# Patient Record
Sex: MALE | Race: OTHER | NOT HISPANIC OR LATINO
[De-identification: names, ages, dates, MRNs, and addresses within clinical notes are randomized per-mention and may not be internally consistent; named-entity substitution may affect disease eponyms.]

---

## 2021-10-20 ENCOUNTER — APPOINTMENT (OUTPATIENT)
Dept: BARIATRICS/WEIGHT MGMT | Facility: CLINIC | Age: 51
End: 2021-10-20

## 2021-10-20 VITALS — BODY MASS INDEX: 36.51 KG/M2 | WEIGHT: 255 LBS | HEIGHT: 70 IN

## 2021-10-20 DIAGNOSIS — Z86.79 PERSONAL HISTORY OF OTHER DISEASES OF THE CIRCULATORY SYSTEM: ICD-10-CM

## 2021-10-20 DIAGNOSIS — Z83.3 FAMILY HISTORY OF DIABETES MELLITUS: ICD-10-CM

## 2021-10-20 DIAGNOSIS — Z86.69 PERSONAL HISTORY OF OTHER DISEASES OF THE NERVOUS SYSTEM AND SENSE ORGANS: ICD-10-CM

## 2021-10-20 DIAGNOSIS — Z80.0 FAMILY HISTORY OF MALIGNANT NEOPLASM OF DIGESTIVE ORGANS: ICD-10-CM

## 2021-10-20 PROBLEM — Z00.00 ENCOUNTER FOR PREVENTIVE HEALTH EXAMINATION: Status: ACTIVE | Noted: 2021-10-20

## 2021-10-20 RX ORDER — FINASTERIDE 5 MG/1
5 TABLET, FILM COATED ORAL
Refills: 0 | Status: ACTIVE | COMMUNITY

## 2021-10-20 RX ORDER — TAMSULOSIN HYDROCHLORIDE 0.4 MG/1
0.4 CAPSULE ORAL
Refills: 0 | Status: ACTIVE | COMMUNITY

## 2021-10-20 RX ORDER — METOPROLOL TARTRATE 75 MG/1
TABLET, FILM COATED ORAL
Refills: 0 | Status: ACTIVE | COMMUNITY

## 2021-10-20 RX ORDER — LOSARTAN POTASSIUM 100 MG/1
TABLET, FILM COATED ORAL
Refills: 0 | Status: ACTIVE | COMMUNITY

## 2021-10-20 RX ORDER — AMLODIPINE BESYLATE 5 MG/1
TABLET ORAL
Refills: 0 | Status: ACTIVE | COMMUNITY

## 2021-10-30 ENCOUNTER — APPOINTMENT (OUTPATIENT)
Dept: BARIATRICS/WEIGHT MGMT | Facility: CLINIC | Age: 51
End: 2021-10-30
Payer: COMMERCIAL

## 2021-10-30 VITALS — BODY MASS INDEX: 36.59 KG/M2 | WEIGHT: 255 LBS

## 2021-10-30 DIAGNOSIS — Z86.39 PERSONAL HISTORY OF OTHER ENDOCRINE, NUTRITIONAL AND METABOLIC DISEASE: ICD-10-CM

## 2021-10-30 DIAGNOSIS — R73.03 PREDIABETES.: ICD-10-CM

## 2021-10-30 PROCEDURE — 99443: CPT | Mod: 95

## 2021-10-30 NOTE — ASSESSMENT
[FreeTextEntry1] : 51 year old male with class II obesity with metabolic syndrome\par Exercise goals- 3x a week- walking with intermittent jogging, starting to lift weights at home\par Nutrition- continue to track on my[employer]itnesspal- discussed food moderations, prebariatric surgery planning for eating q3h as instructed by RD, changing macronutritent balance of food intake- more egg whites, portion sizes of avocado, increasing vegetables, portion sizes of fruit, general protein goals\par Continue adequate hydration\par Will not add medications at this time d/t planning for surgery\par F/U 1-2 months

## 2021-10-30 NOTE — HISTORY OF PRESENT ILLNESS
[FreeTextEntry1] : 51 year old male for medical weight loss consultation.\par Patient has been struggling with his weight for many years.  \par Is planning to have a sleeve with Dr. Fung at [facility] but insurance requires 6 months of counseling prior\par Met with bariatric RD last week\par Has not been consistently exercising\par Good water intake\par +work stress- works for 2 non-profits\par Using [employer] intermittently \par +emotional eating patterns- self realized

## 2021-10-30 NOTE — REASON FOR VISIT
[Other Location: e.g. School (Enter Location, City,State)___] : at [unfilled], at the time of the visit. [Other Location: e.g. Home (Enter Location, City,State)___] : at [unfilled] [Verbal consent obtained from patient] : the patient, [unfilled] [Initial Consultation] : an initial consultation for

## 2021-11-24 ENCOUNTER — APPOINTMENT (OUTPATIENT)
Dept: BARIATRICS/WEIGHT MGMT | Facility: CLINIC | Age: 51
End: 2021-11-24
Payer: COMMERCIAL

## 2021-11-24 PROCEDURE — 99442: CPT | Mod: 95

## 2021-11-24 NOTE — REASON FOR VISIT
[Other Location: e.g. Home (Enter Location, City,State)___] : at [unfilled] [Verbal consent obtained from patient] : the patient, [unfilled]

## 2021-11-26 NOTE — ASSESSMENT
[FreeTextEntry1] : 51 year old male with class II obesity c/b HTN\par Discussed importance of establishing healthy routines prior to bariatric surgery to lead to long term success especially if he think she may have an addictive personality which may influence food choices in the future\par Patient to start prioritizing his own health\lisa Is going to send me his meal log for review- from what he told me I advised him to make sure he is eating enough protein/fruit and vegetables\par F/U 1month

## 2021-11-26 NOTE — HISTORY OF PRESENT ILLNESS
[FreeTextEntry1] : 51 year old male for medical weight loss consultation.\par Patient has been struggling with his weight for many years.  \par Is planning to have a sleeve with Dr. Fung at [facility] but insurance requires 6 months of counseling prior\par Met with bariatric RD last week\par Has not been consistently exercising\par Good water intake\par +work stress- works for 2 non-profits\par Using [employer] intermittently \par +emotional eating patterns- self realized \par \par 11/24/21- Patient arrived late for appointment due to work conflict.  He states he now realizes that he needs to cut back on work both for weight loss and for his family.  Not exercising.  Planning to have bariatric surgery but worried about addictive personality and what will happen to him in terms of eating habits.  Has not weighed himself.

## 2021-12-02 VITALS — BODY MASS INDEX: 37.02 KG/M2 | WEIGHT: 258 LBS

## 2021-12-08 VITALS — BODY MASS INDEX: 36.59 KG/M2 | WEIGHT: 255 LBS

## 2021-12-17 VITALS — WEIGHT: 259.5 LBS | BODY MASS INDEX: 37.23 KG/M2

## 2021-12-22 ENCOUNTER — APPOINTMENT (OUTPATIENT)
Dept: BARIATRICS/WEIGHT MGMT | Facility: CLINIC | Age: 51
End: 2021-12-22
Payer: COMMERCIAL

## 2021-12-22 PROCEDURE — 99213 OFFICE O/P EST LOW 20 MIN: CPT | Mod: 95

## 2021-12-23 NOTE — REASON FOR VISIT
[Home] : at home, [unfilled] , at the time of the visit. [Other Location: e.g. Home (Enter Location, City,State)___] : at [unfilled] [Verbal consent obtained from patient] : the patient, [unfilled] [Follow-Up Visit] : a follow-up visit for [Hyperlipidemia] : hyperlipidemia [Obesity] : obesity [Obstructive Sleep Apena] : obstructive sleep apena

## 2021-12-23 NOTE — HISTORY OF PRESENT ILLNESS
[FreeTextEntry1] : 51 year old male for medical weight loss consultation.\par Patient has been struggling with his weight for many years.  \par Is planning to have a sleeve with Dr. Fung at [facility] but insurance requires 6 months of counseling prior\par Met with bariatric RD last week\par Has not been consistently exercising\par Good water intake\par +work stress- works for 2 non-profits\par Using [employer] intermittently \par +emotional eating patterns- self realized \par \par 11/24/21- Patient arrived late for appointment due to work conflict.  He states he now realizes that he needs to cut back on work both for weight loss and for his family.  Not exercising.  Planning to have bariatric surgery but worried about addictive personality and what will happen to him in terms of eating habits.  Has not weighed himself.  \par \par 12/23/21- Patient's weight increased by 4 pounds.  Had sent me food dairy to review- noted high saturated fat content of some foods.  D/W patient.  Not consistently exercising.  Difficulty finding time- going to concentrate on his health in 2022.

## 2021-12-23 NOTE — ASSESSMENT
[FreeTextEntry1] : 51 year old male with metabolic syndrome, class II obesity\par Discussed bariatric surgery vs. medications and he would like to have bariatric surgery- planning for April 2022.  Required to have 6 months of visits to qualify by insurance.\par Will continue lifestyle changes- increase vegetables, fruit, lean protein sources, pre plan meals, limit takeout options\par Increase exercise- can break it up into smaller increments during the day\par F/U 1 month

## 2022-01-12 VITALS — BODY MASS INDEX: 36.63 KG/M2 | WEIGHT: 255.3 LBS

## 2022-01-21 VITALS — WEIGHT: 257.5 LBS | BODY MASS INDEX: 36.95 KG/M2

## 2022-01-26 ENCOUNTER — APPOINTMENT (OUTPATIENT)
Dept: BARIATRICS/WEIGHT MGMT | Facility: CLINIC | Age: 52
End: 2022-01-26
Payer: COMMERCIAL

## 2022-01-26 VITALS — BODY MASS INDEX: 37.19 KG/M2 | WEIGHT: 259.2 LBS

## 2022-01-26 PROCEDURE — 99213 OFFICE O/P EST LOW 20 MIN: CPT | Mod: 95

## 2022-01-26 NOTE — ASSESSMENT
[FreeTextEntry1] : 51 year old male with class II obesity and h/o HTN\par Discussed healthy plate portion sizes, limiting carbs, increasing protein, more emphasis on increasing vegetables and batch cooking them for the week.  Patient declines to see [facility] RD but will work out a meal plan for preplanning that we can go over at his next visit.  Start using meal tracker.  \par Discussed importance of establishing an exercise routine- will start slowly with becoming more active during the day with hand weights or walking during a meeting if the weather is nice.  May consider going back to the gym in the future depending on COVID\par Planning on Bariatric surgery in late April \par F/U 1 month

## 2022-01-26 NOTE — HISTORY OF PRESENT ILLNESS
[FreeTextEntry1] : 51 year old male for medical weight loss consultation.\par Patient has been struggling with his weight for many years.  \par Is planning to have a sleeve with Dr. Fung at [facility] but insurance requires 6 months of counseling prior\par Met with bariatric RD last week\par Has not been consistently exercising\par Good water intake\par +work stress- works for 2 non-profits\par Using [employer] intermittently \par +emotional eating patterns- self realized \par \par 11/24/21- Patient arrived late for appointment due to work conflict.  He states he now realizes that he needs to cut back on work both for weight loss and for his family.  Not exercising.  Planning to have bariatric surgery but worried about addictive personality and what will happen to him in terms of eating habits.  Has not weighed himself.  \par \par 12/23/21- Patient's weight increased by 4 pounds.  Had sent me food dairy to review- noted high saturated fat content of some foods.  D/W patient.  Not consistently exercising.  Difficulty finding time- going to concentrate on his health in 2022.  \par \par 1/26/22- Patient's weight has been in the same range.  Finding it difficult to work out- not going to the gym because of COVID.  Does walk his dog 1 mile 3x a week.  Starting using protein powder for strawberry smoothies for some breakfasts with added kale or spinach.  Eating 2 eggs with avocado, peppers some mornings.  Occasional mini bagel for breakfasts.  Dinner is mostly rice and beans or pasta with homemade sauce and shrimp.

## 2022-02-12 VITALS — BODY MASS INDEX: 37.48 KG/M2 | WEIGHT: 261.2 LBS

## 2022-02-24 ENCOUNTER — APPOINTMENT (OUTPATIENT)
Dept: BARIATRICS/WEIGHT MGMT | Facility: CLINIC | Age: 52
End: 2022-02-24
Payer: COMMERCIAL

## 2022-02-24 VITALS — BODY MASS INDEX: 37 KG/M2 | WEIGHT: 257.9 LBS

## 2022-02-24 PROCEDURE — 99443: CPT | Mod: 95

## 2022-03-03 VITALS — BODY MASS INDEX: 37.18 KG/M2 | WEIGHT: 259.1 LBS

## 2022-03-24 ENCOUNTER — APPOINTMENT (OUTPATIENT)
Dept: BARIATRICS/WEIGHT MGMT | Facility: CLINIC | Age: 52
End: 2022-03-24
Payer: COMMERCIAL

## 2022-03-24 DIAGNOSIS — E66.01 MORBID (SEVERE) OBESITY DUE TO EXCESS CALORIES: ICD-10-CM

## 2022-03-24 PROCEDURE — 99213 OFFICE O/P EST LOW 20 MIN: CPT | Mod: 95

## 2022-03-24 NOTE — HISTORY OF PRESENT ILLNESS
[FreeTextEntry1] : 51 year old male for medical weight loss consultation.\par Patient has been struggling with his weight for many years.  \par Is planning to have a sleeve with Dr. Fung at [facility] but insurance requires 6 months of counseling prior\par Met with bariatric RD last week\par Has not been consistently exercising\par Good water intake\par +work stress- works for 2 non-profits\par Using [employer] intermittently \par +emotional eating patterns- self realized \par \par 11/24/21- Patient arrived late for appointment due to work conflict.  He states he now realizes that he needs to cut back on work both for weight loss and for his family.  Not exercising.  Planning to have bariatric surgery but worried about addictive personality and what will happen to him in terms of eating habits.  Has not weighed himself.  \par \par 12/23/21- Patient's weight increased by 4 pounds.  Had sent me food dairy to review- noted high saturated fat content of some foods.  D/W patient.  Not consistently exercising.  Difficulty finding time- going to concentrate on his health in 2022.  \par \par 1/26/22- Patient's weight has been in the same range.  Finding it difficult to work out- not going to the gym because of COVID.  Does walk his dog 1 mile 3x a week.  Starting using protein powder for strawberry smoothies for some breakfasts with added kale or spinach.  Eating 2 eggs with avocado, peppers some mornings.  Occasional mini bagel for breakfasts.  Dinner is mostly rice and beans or pasta with homemade sauce and shrimp.  \par \par 2/24/22- Patient -4 pounds.  Mindfully eating more protein, attended bariatric support group through [facility], trying to exercise 15 minutes/day.  Spoke to psychiatrist about relationship with food.  Limiting takeout.  \par \par 3/24/22- Patent +2 pounds.  Has started to have protein shakes, cut down on carbs.  Starting PT for back injury and has joined the gym.  Adding more vegetables.  Had presurgical EGD.

## 2022-03-24 NOTE — ASSESSMENT
[FreeTextEntry1] : 51 year old male with class II obesity\par Continue to increase exercise- discussed importance of cardio, stretching and strength training\par Continue to increase protein, vegetables, and limit carbs.  Practice mindful eating and avoid unhealthy snacks\par Planning for sleeve next month- answered various questions about post operative process\par

## 2022-06-15 ENCOUNTER — HOSPITAL ENCOUNTER (EMERGENCY)
Dept: HOSPITAL 74 - JER | Age: 52
Discharge: HOME | End: 2022-06-15
Payer: COMMERCIAL

## 2022-06-15 VITALS — BODY MASS INDEX: 33.1 KG/M2

## 2022-06-15 VITALS — TEMPERATURE: 98.1 F | DIASTOLIC BLOOD PRESSURE: 103 MMHG | SYSTOLIC BLOOD PRESSURE: 194 MMHG | HEART RATE: 51 BPM

## 2022-06-15 DIAGNOSIS — T83.9XXA: Primary | ICD-10-CM

## 2022-06-15 PROCEDURE — 3E023GC INTRODUCTION OF OTHER THERAPEUTIC SUBSTANCE INTO MUSCLE, PERCUTANEOUS APPROACH: ICD-10-PCS

## 2024-04-22 ENCOUNTER — OFFICE (OUTPATIENT)
Facility: LOCATION | Age: 54
Setting detail: OPHTHALMOLOGY
End: 2024-04-22
Payer: COMMERCIAL

## 2024-04-22 DIAGNOSIS — H40.013: ICD-10-CM

## 2024-04-22 DIAGNOSIS — H25.13: ICD-10-CM

## 2024-04-22 DIAGNOSIS — H02.825: ICD-10-CM

## 2024-04-22 DIAGNOSIS — H16.223: ICD-10-CM

## 2024-04-22 PROCEDURE — 92250 FUNDUS PHOTOGRAPHY W/I&R: CPT | Performed by: OPTOMETRIST

## 2024-04-22 PROCEDURE — 99213 OFFICE O/P EST LOW 20 MIN: CPT | Performed by: OPTOMETRIST

## 2024-04-22 PROCEDURE — 92083 EXTENDED VISUAL FIELD XM: CPT | Performed by: OPTOMETRIST

## 2024-10-28 ENCOUNTER — OFFICE (OUTPATIENT)
Facility: LOCATION | Age: 54
Setting detail: OPHTHALMOLOGY
End: 2024-10-28
Payer: COMMERCIAL

## 2024-10-28 DIAGNOSIS — H43.393: ICD-10-CM

## 2024-10-28 DIAGNOSIS — H16.223: ICD-10-CM

## 2024-10-28 DIAGNOSIS — H25.13: ICD-10-CM

## 2024-10-28 DIAGNOSIS — H02.825: ICD-10-CM

## 2024-10-28 DIAGNOSIS — H35.413: ICD-10-CM

## 2024-10-28 DIAGNOSIS — H40.013: ICD-10-CM

## 2024-10-28 PROCEDURE — 92014 COMPRE OPH EXAM EST PT 1/>: CPT | Performed by: OPTOMETRIST

## 2024-10-28 PROCEDURE — 92133 CPTRZD OPH DX IMG PST SGM ON: CPT | Performed by: OPTOMETRIST

## 2024-10-28 PROCEDURE — 92202 OPSCPY EXTND ON/MAC DRAW: CPT | Performed by: OPTOMETRIST

## 2024-10-28 ASSESSMENT — REFRACTION_MANIFEST
OD_AXIS: 005
OS_CYLINDER: -0.50
OD_CYLINDER: -0.50
OS_VA1: 20/25
OD_VA1: 20/20
OS_SPHERE: -6.25
OS_AXIS: 175
OD_SPHERE: -6.75

## 2024-10-28 ASSESSMENT — PACHYMETRY
OS_CT_CORRECTION: -4
OS_CT_UM: 609
OD_CT_UM: 598
OD_CT_CORRECTION: -4

## 2024-10-28 ASSESSMENT — REFRACTION_AUTOREFRACTION
OS_SPHERE: -6.25
OD_AXIS: 005
OS_AXIS: 175
OS_CYLINDER: -0.50
OD_CYLINDER: -0.50
OD_SPHERE: -6.75

## 2024-10-28 ASSESSMENT — REFRACTION_CURRENTRX
OD_AXIS: 180
OS_OVR_VA: 20/
OS_SPHERE: -7.50
OD_SPHERE: -7.50
OS_AXIS: 108
OD_ADD: +1.50
OS_ADD: +1.50
OD_CYLINDER: SPH
OS_CYLINDER: -0.25
OD_OVR_VA: 20/

## 2024-10-28 ASSESSMENT — TONOMETRY
OD_IOP_MMHG: 17
OS_IOP_MMHG: 16

## 2024-10-28 ASSESSMENT — TEAR BREAK UP TIME (TBUT)
OD_TBUT: 2+ 3+
OS_TBUT: 2+ 3+

## 2024-10-28 ASSESSMENT — CONFRONTATIONAL VISUAL FIELD TEST (CVF)
OD_FINDINGS: FULL
OS_FINDINGS: FULL

## 2024-10-28 ASSESSMENT — KERATOMETRY
OD_K2POWER_DIOPTERS: 45.25
OS_K2POWER_DIOPTERS: 44.75
OD_K1POWER_DIOPTERS: 44.00
OD_AXISANGLE_DEGREES: 085
OS_K1POWER_DIOPTERS: 43.25
OS_AXISANGLE_DEGREES: 095

## 2024-10-28 ASSESSMENT — VISUAL ACUITY
OD_BCVA: 20/250
OS_BCVA: 20/200

## 2024-10-28 ASSESSMENT — SUPERFICIAL PUNCTATE KERATITIS (SPK)
OS_SPK: 1+
OD_SPK: 1+

## 2025-04-28 ENCOUNTER — OFFICE (OUTPATIENT)
Facility: LOCATION | Age: 55
Setting detail: OPHTHALMOLOGY
End: 2025-04-28
Payer: COMMERCIAL

## 2025-04-28 DIAGNOSIS — H25.13: ICD-10-CM

## 2025-04-28 DIAGNOSIS — H16.223: ICD-10-CM

## 2025-04-28 DIAGNOSIS — H02.825: ICD-10-CM

## 2025-04-28 DIAGNOSIS — H40.013: ICD-10-CM

## 2025-04-28 PROCEDURE — 92083 EXTENDED VISUAL FIELD XM: CPT | Performed by: OPTOMETRIST

## 2025-04-28 PROCEDURE — 92250 FUNDUS PHOTOGRAPHY W/I&R: CPT | Performed by: OPTOMETRIST

## 2025-04-28 PROCEDURE — 99213 OFFICE O/P EST LOW 20 MIN: CPT | Performed by: OPTOMETRIST

## 2025-04-28 ASSESSMENT — VISUAL ACUITY
OD_BCVA: 20/125
OS_BCVA: 20/150-1

## 2025-04-28 ASSESSMENT — KERATOMETRY
OS_AXISANGLE_DEGREES: 085
OS_K2POWER_DIOPTERS: 45.25
OS_K1POWER_DIOPTERS: 43.75
OD_K1POWER_DIOPTERS: 43.75
OD_AXISANGLE_DEGREES: 100
OD_K2POWER_DIOPTERS: 45.25

## 2025-04-28 ASSESSMENT — TONOMETRY
OS_IOP_MMHG: 14
OD_IOP_MMHG: 14

## 2025-04-28 ASSESSMENT — REFRACTION_CURRENTRX
OD_OVR_VA: 20/
OD_AXIS: 180
OS_SPHERE: -7.50
OS_OVR_VA: 20/
OD_SPHERE: -7.50
OS_CYLINDER: -0.25
OS_ADD: +1.50
OD_CYLINDER: SPH
OD_ADD: +1.50
OS_AXIS: 108

## 2025-04-28 ASSESSMENT — REFRACTION_MANIFEST
OD_AXIS: 020
OD_CYLINDER: -1.25
OD_VA1: 20/20
OS_SPHERE: -6.75
OS_AXIS: 160
OS_CYLINDER: -0.50
OS_VA1: 20/25
OD_SPHERE: -6.75

## 2025-04-28 ASSESSMENT — PACHYMETRY
OS_CT_UM: 609
OS_CT_CORRECTION: -4
OD_CT_CORRECTION: -4
OD_CT_UM: 598

## 2025-04-28 ASSESSMENT — REFRACTION_AUTOREFRACTION
OS_SPHERE: -6.75
OD_CYLINDER: -1.25
OD_AXIS: 020
OS_CYLINDER: -0.50
OD_SPHERE: -6.75
OS_AXIS: 160

## 2025-04-28 ASSESSMENT — TEAR BREAK UP TIME (TBUT)
OD_TBUT: 2+ 3+
OS_TBUT: 2+ 3+

## 2025-04-28 ASSESSMENT — SUPERFICIAL PUNCTATE KERATITIS (SPK)
OS_SPK: 1+
OD_SPK: 1+